# Patient Record
Sex: FEMALE | URBAN - METROPOLITAN AREA
[De-identification: names, ages, dates, MRNs, and addresses within clinical notes are randomized per-mention and may not be internally consistent; named-entity substitution may affect disease eponyms.]

---

## 2024-07-12 ENCOUNTER — TELEPHONE (OUTPATIENT)
Dept: HEMATOLOGY/ONCOLOGY | Facility: CLINIC | Age: 49
End: 2024-07-12

## 2024-07-12 NOTE — NURSING
Message sent to Ericka at Dr. Jeter's office.  She spoke with the pt.  She is having a 2nd biopsy on Tuesday.  Ericka will get her scheduled once those results are in.

## 2024-07-12 NOTE — TELEPHONE ENCOUNTER
----- Message from Sarah Hauser, Patient Care Assistant sent at 7/11/2024  1:57 PM CDT -----  Type: Needs Medical Advice  Who Called:  aylin    Best Call Back Number: [phone]    Additional Information: aylin  states she was just diagnose with carnoicma breast cancer . She states she would like a second look  please call to further discuss , thank you